# Patient Record
Sex: FEMALE | Race: WHITE | Employment: FULL TIME | URBAN - METROPOLITAN AREA
[De-identification: names, ages, dates, MRNs, and addresses within clinical notes are randomized per-mention and may not be internally consistent; named-entity substitution may affect disease eponyms.]

---

## 2019-01-17 PROBLEM — E04.1 THYROID NODULE: Status: ACTIVE | Noted: 2019-01-17

## 2019-01-17 PROBLEM — E06.3 HASHIMOTO'S THYROIDITIS: Status: ACTIVE | Noted: 2019-01-17

## 2019-05-22 ENCOUNTER — HOSPITAL ENCOUNTER (OUTPATIENT)
Dept: LAB | Age: 29
Discharge: HOME | End: 2019-05-22
Payer: COMMERCIAL

## 2019-05-22 DIAGNOSIS — C73 PAPILLARY THYROID CARCINOMA (HCC): ICD-10-CM

## 2019-05-22 DIAGNOSIS — E89.0 POSTSURGICAL HYPOTHYROIDISM: ICD-10-CM

## 2019-05-22 LAB — TSH W FREE THYROID I,TSHELE: 0.18 UIU/ML (ref 0.36–3.74)

## 2019-05-22 PROCEDURE — 84439 ASSAY OF FREE THYROXINE: CPT

## 2019-05-22 PROCEDURE — 36415 COLL VENOUS BLD VENIPUNCTURE: CPT

## 2019-05-22 PROCEDURE — 86800 THYROGLOBULIN ANTIBODY: CPT

## 2019-05-22 PROCEDURE — 84480 ASSAY TRIIODOTHYRONINE (T3): CPT

## 2019-05-22 PROCEDURE — 84443 ASSAY THYROID STIM HORMONE: CPT

## 2019-05-22 PROCEDURE — 84432 ASSAY OF THYROGLOBULIN: CPT

## 2019-05-23 LAB
T3 SERPL-MCNC: 1.54 NG/ML (ref 0.6–1.81)
T4 FREE SERPL-MCNC: 1.6 NG/DL (ref 0.78–1.46)
THYROGLOB AB SERPL-ACNC: <1 IU/ML (ref 0–0.9)
THYROGLOBULIN, SERUM, 006694: 0.1 NG/ML (ref 1.5–38.5)

## 2019-06-17 ENCOUNTER — HOSPITAL ENCOUNTER (OUTPATIENT)
Dept: ULTRASOUND IMAGING | Age: 29
Discharge: HOME | End: 2019-06-17
Attending: INTERNAL MEDICINE
Payer: COMMERCIAL

## 2019-06-17 VITALS
HEART RATE: 108 BPM | SYSTOLIC BLOOD PRESSURE: 119 MMHG | WEIGHT: 174 LBS | BODY MASS INDEX: 27.25 KG/M2 | RESPIRATION RATE: 20 BRPM | OXYGEN SATURATION: 100 % | DIASTOLIC BLOOD PRESSURE: 77 MMHG | TEMPERATURE: 97.6 F

## 2019-06-17 DIAGNOSIS — C73 PAPILLARY THYROID CARCINOMA (HCC): ICD-10-CM

## 2019-06-17 PROCEDURE — 10005 FNA BX W/US GDN 1ST LES: CPT

## 2019-06-17 PROCEDURE — 74011250636 HC RX REV CODE- 250/636: Performed by: PHYSICIAN ASSISTANT

## 2019-06-17 PROCEDURE — 88173 CYTOPATH EVAL FNA REPORT: CPT

## 2019-06-17 PROCEDURE — 88305 TISSUE EXAM BY PATHOLOGIST: CPT

## 2019-06-17 RX ORDER — LIDOCAINE HYDROCHLORIDE 20 MG/ML
40-120 INJECTION, SOLUTION INFILTRATION; PERINEURAL ONCE
Status: COMPLETED | OUTPATIENT
Start: 2019-06-17 | End: 2019-06-17

## 2019-06-17 RX ADMIN — LIDOCAINE HYDROCHLORIDE 120 MG: 20 INJECTION, SOLUTION INFILTRATION; PERINEURAL at 15:39

## 2019-06-17 NOTE — DISCHARGE INSTRUCTIONS
[address]  Department of Interventional Radiology  [facility]  [phone] Office  [phone] Fax    BIOPSY DISCHARGE INSTRUCTIONS    General Instructions:     A biopsy is the removal of a small piece of tissue for microscopic examination or testing. Healthy tissue can be obtained for the purpose of tissue-type matching for transplants. Unhealthy tissues are more commonly biopsied to diagnose disease. Lung Biopsy:     A needle lung biopsy is performed when there is a mass discovered in the lung area. The most serious risk is infection, bleeding, and pneumothorax (a collapsed lung). Signs of pneumothorax include shortness of breath, rapid heart rate, and blueness of the skin. If any of these occur, call 911. Liver Biopsy: This test helps detect cancer, infections, and the cause of an enlargement of the liver or elevated liver enzymes. It also helps to diagnose a number of liver diseases. The pain associated with the procedure may be felt in the shoulder. The risks include internal bleeding, pneumothorax, and injury to the surrounding organs. Renal Biopsy: This procedure is sometimes done to evaluate a transplanted kidney. It is also used to evaluate unexplained decrease in kidney function, blood, or protein in the urine. You may see bright red blood in the urine the first 24 hours after the test. If the bleeding lasts longer, you need to call your doctor. There is a risk of infection and bleeding into the muscle, which may cause soreness. Spinal Biopsy: This test is sometimes done in conjunction with other procedures. Your back will be sore, as we are taking a small sample of bone, which is slightly more difficult to sample than tissue. General Biopsy:     A mass can grow in any area of the body, and we are taking a specimen as ordered by your doctor. The risks are the same.  They include bleeding, pain, and infection. Home Care Instructions: You may resume your regular diet and medication regimen. Do not drink alcohol, drive, or make any important legal decisions in the next 24 hours. Do not lift anything heavier than a gallon of milk until the soreness goes away. You may use over the counter acetaminophen or ibuprofen for the soreness. You may apply an ice pack to the affected area for 20-30 minutes at time for the first 24 hours. After that, you may apply a heat pack. Call If: You should call your Physician and/or the Radiology Nurse if you have any questions or concerns about the biopsy site. Call if you should have increased pain, fever, redness, drainage, or bleeding more than a small spot on the bandage. Follow-Up Instructions: Please see your ordering doctor as he/she has requested. To Reach Us: If you have any questions about your procedure, please call the Interventional Radiology department at [phone]. After business hours (5pm) and weekends, call the answering service at [phone] and ask for the Radiologist on call to be paged. Si tiene Preguntas acerca del procedimiento, por favor llame al departamento de Radiología Intervencional al [phone]. Después de horas de oficina (5 pm) y los fines de [place], llamar al Yonatan Olvera al [phone] y pregunte por el Radiologo de [facility].        Patient Signature:  Date: 6/17/2019  Discharging Nurse: Senia Boogie RN

## 2019-06-17 NOTE — PROCEDURES
Department of Interventional Radiology  [phone]        Interventional Radiology Brief Procedure Note    Patient: Aftab Barnett MRN: [id number]  SSN: [id number]    YOB: 1990  Age: 33 y.o. Sex: female      Date of Procedure: 6/17/2019    Pre-Procedure Diagnosis: Thyroid Ca s/p thyroidectomy. Post-Procedure Diagnosis: SAME    Procedure(s): Image Guided Biopsy    Brief Description of Procedure: FNA    Performed By: Fabi Pinon MD     Assistants: None    Anesthesia:Lidocaine    Estimated Blood Loss: None    Specimens:  Cytology    Implants:  None    Findings: Two, adjacent areas of hypoechogenicity in the surgical bed. Complications: None    Recommendations: Consider CT Soft Tissue Neck for further evaluation. Follow Up: Dr Scott Culver.      Signed By: Fabi Pinon MD     June 17, 2019

## 2022-03-19 PROBLEM — C73 PAPILLARY THYROID CARCINOMA (HCC): Status: ACTIVE | Noted: 2019-05-22

## 2022-05-30 DIAGNOSIS — C73 PAPILLARY THYROID CARCINOMA (HCC): ICD-10-CM

## 2022-05-30 DIAGNOSIS — E89.0 POSTSURGICAL HYPOTHYROIDISM: Primary | ICD-10-CM

## 2022-07-07 ENCOUNTER — APPOINTMENT (RX ONLY)
Dept: URBAN - NONMETROPOLITAN AREA CLINIC 1 | Facility: CLINIC | Age: 32
Setting detail: DERMATOLOGY
End: 2022-07-07

## 2022-07-07 DIAGNOSIS — L73.8 OTHER SPECIFIED FOLLICULAR DISORDERS: ICD-10-CM | Status: STABLE

## 2022-07-07 DIAGNOSIS — L81.4 OTHER MELANIN HYPERPIGMENTATION: ICD-10-CM

## 2022-07-07 DIAGNOSIS — L57.8 OTHER SKIN CHANGES DUE TO CHRONIC EXPOSURE TO NONIONIZING RADIATION: ICD-10-CM

## 2022-07-07 DIAGNOSIS — D18.0 HEMANGIOMA: ICD-10-CM

## 2022-07-07 DIAGNOSIS — Z71.89 OTHER SPECIFIED COUNSELING: ICD-10-CM

## 2022-07-07 DIAGNOSIS — D22 MELANOCYTIC NEVI: ICD-10-CM

## 2022-07-07 PROBLEM — D22.5 MELANOCYTIC NEVI OF TRUNK: Status: ACTIVE | Noted: 2022-07-07

## 2022-07-07 PROBLEM — D18.01 HEMANGIOMA OF SKIN AND SUBCUTANEOUS TISSUE: Status: ACTIVE | Noted: 2022-07-07

## 2022-07-07 PROCEDURE — ? COUNSELING

## 2022-07-07 PROCEDURE — ? FULL BODY SKIN EXAM

## 2022-07-07 PROCEDURE — ? SUNSCREEN RECOMMENDATIONS

## 2022-07-07 PROCEDURE — 99203 OFFICE O/P NEW LOW 30 MIN: CPT

## 2022-07-07 PROCEDURE — ? TREATMENT REGIMEN

## 2022-07-07 ASSESSMENT — LOCATION DETAILED DESCRIPTION DERM
LOCATION DETAILED: RIGHT FOREHEAD
LOCATION DETAILED: RIGHT SUPERIOR MEDIAL UPPER BACK
LOCATION DETAILED: RIGHT SUPERIOR UPPER BACK
LOCATION DETAILED: LEFT INFERIOR MEDIAL MALAR CHEEK
LOCATION DETAILED: RIGHT INFERIOR CENTRAL MALAR CHEEK

## 2022-07-07 ASSESSMENT — LOCATION SIMPLE DESCRIPTION DERM
LOCATION SIMPLE: LEFT CHEEK
LOCATION SIMPLE: RIGHT FOREHEAD
LOCATION SIMPLE: RIGHT UPPER BACK
LOCATION SIMPLE: RIGHT CHEEK

## 2022-07-07 ASSESSMENT — LOCATION ZONE DERM
LOCATION ZONE: FACE
LOCATION ZONE: TRUNK

## 2022-07-07 NOTE — PROCEDURE: SUNSCREEN RECOMMENDATIONS

## 2022-07-07 NOTE — PROCEDURE: MIPS QUALITY
[employer] message sent to patient   Last physical with PCP was on 9.21.2018  
Quality 226: Preventive Care And Screening: Tobacco Use: Screening And Cessation Intervention: Patient screened for tobacco use and is an ex/non-smoker
Detail Level: Detailed
Quality 130: Documentation Of Current Medications In The Medical Record: Current Medications Documented
Quality 431: Preventive Care And Screening: Unhealthy Alcohol Use - Screening: Patient identified as an unhealthy alcohol user when screened for unhealthy alcohol use using a systematic screening method and received brief counseling

## 2022-07-25 ENCOUNTER — OFFICE VISIT (OUTPATIENT)
Dept: ENDOCRINOLOGY | Age: 32
End: 2022-07-25
Payer: COMMERCIAL

## 2022-07-25 VITALS
OXYGEN SATURATION: 99 % | SYSTOLIC BLOOD PRESSURE: 124 MMHG | DIASTOLIC BLOOD PRESSURE: 84 MMHG | HEART RATE: 83 BPM | HEIGHT: 67 IN | WEIGHT: 188 LBS | BODY MASS INDEX: 29.51 KG/M2

## 2022-07-25 DIAGNOSIS — E89.0 POSTSURGICAL HYPOTHYROIDISM: ICD-10-CM

## 2022-07-25 DIAGNOSIS — C73 PAPILLARY THYROID CARCINOMA (HCC): Primary | ICD-10-CM

## 2022-07-25 LAB
T3 SERPL-MCNC: 1.32 NG/ML (ref 0.6–1.81)
T4 FREE SERPL-MCNC: 1.5 NG/DL (ref 0.78–1.46)
TSH W FREE THYROID IF ABNORMAL: 0.09 UIU/ML (ref 0.36–3.74)

## 2022-07-25 PROCEDURE — 76536 US EXAM OF HEAD AND NECK: CPT | Performed by: INTERNAL MEDICINE

## 2022-07-25 PROCEDURE — 99214 OFFICE O/P EST MOD 30 MIN: CPT | Performed by: INTERNAL MEDICINE

## 2022-07-25 RX ORDER — LEVOTHYROXINE SODIUM 0.15 MG/1
150 TABLET ORAL
Qty: 30 TABLET | Refills: 11 | Status: SHIPPED | OUTPATIENT
Start: 2022-07-25 | End: 2022-07-26 | Stop reason: SDUPTHER

## 2022-07-25 ASSESSMENT — ENCOUNTER SYMPTOMS
DIARRHEA: 0
CONSTIPATION: 0

## 2022-07-25 NOTE — PROGRESS NOTES
hilum.      Follow-up and Dispositions    Return in about 6 months (around 1/25/2023). HISTORY OF PRESENT ILLNESS:    THYROID CANCER     Presentation: Afirma [address] suspicious left lobe/isthmic nodule status post total thyroidectomy 2/15/2019 (pathology revealed an infiltrative, follicular variant papillary thyroid carcinoma in the left lobe measuring 1.5 x 1.1 x 1.0 cm, negative margins, no angioinvasion, no lymphatic invasion, no perineural invasion, no extrathyroidal extension; +lymphocytic thyroiditis; pT1b pNX). Surgical complications: None. Current symptoms: Denies cervical lymphadenopathy, dysphagia, hoarseness. Imaging:  Thyroid ultrasound 1/17/2019: Right lobe 2.01 x 1.83 x 5.82 cm, markedly heterogeneous echotexture. There are no obvious nodules. In the mid right lobe posteriorly there are a couple of hypoechoic areas likely representing pseudo-nodules measuring 0.42 x 0.84 cm and 0.33 x 0.80 cm, respectively. Inferior to the right lobe is a probable lymph node with a fatty hilum measuring 0.71 x 0.93 x 1.24 cm. Isthmus measures 0.70 cm. Left lobe 1.73 x 1.88 x 4.89 cm, markedly heterogeneous echotexture. At the junction of the isthmus and left lobe is a solid, heterogeneous nodule measuring 0.92 x 1.46 x 1.45 cm. The majority of the nodule is somewhat hyperechoic and lobulated although it does have a hypoechoic rim. It does not contain any obvious microcalcifications. Neck ultrasound 5/22/2019: There are no masses in the right thyroid bed. Examination of the right central and lateral cervical compartments reveals no abnormal lymph nodes. There is a hypoechoic nodule in the left thyroid bed measuring 0.47 x 0.54 x 0.69 cm containing no obvious microcalcifications or internal blood flow. Just inferiorly and also in level 6 is a hypoechoic, heterogeneous nodule measuring 0.64 x 1.02 x 0.89 cm containing no obvious microcalcifications or internal blood flow.   There is a left level 3/4 lymph node measuring 0.26 x 0.67 x 1.43 cm containing a normal fatty hilum. There is a left level 2 lymph node measuring 0.33 x 1.10 x 1.34 cm which appears to contain a fatty hilum on the longitudinal view. There is a left lateral level 3 lymph node measuring 0.40 x 0.98 x 1.05 cm with no obvious fatty hilum. FNA biopsy left level 6 nodule 6/17/2019: Nondiagnostic. Neck ultrasound 10/11/2019: There are no masses in the right thyroid bed. There is a right level 3 lymph node measuring 0.34 x 0.65 x 1.18 cm with a fatty hilum. There is a right level 2 lymph node measuring 0.25 x 0.63 x 1.73 cm with a fatty hilum. There is a right level 5 lymph node measuring 0.37 x 1.04 x 1.98 cm with a fatty hilum. There is a hypoechoic nodule in the left thyroid bed measuring 0.29 x 0.45 x 0.59 cm containing no obvious microcalcifications or internal blood flow. Just inferiorly and also in level 6 is a hypoechoic, heterogeneous nodule measuring 0.41 x 0.96 x 1.16 cm containing no obvious microcalcifications or internal blood flow. In some views it does appear that it could contain a fatty hilum and may therefore represent a lymph node. There is a left level 2/3 lymph node measuring 0.27 x 1.04 x 1.28 cm containing a fatty hilum. There is a left level 4 lymph node measuring 0.34 x 1.09 x 1.43 cm containing a fatty hilum. Neck ultrasound 7/15/2020: There are no masses in the right thyroid bed. There is a right level 3 lymph node measuring 0.27 x 0.65 x 0.87 cm with a fatty hilum. There is a hypoechoic nodule in the left thyroid bed measuring 0.28 x 0.39 x 0.31 cm containing no internal blood flow or microcalcifications. Just inferiorly and also in level 6 is a hypoechoic, heterogeneous nodule measuring 0.49 x 0.64 x 0.83 cm containing no internal blood flow or microcalcifications. There is a left level 3/4 lymph node measuring 0.22 x 0.48 x 1.36 cm containing a fatty hilum.   There is a left level 3 lymph node measuring 0.27 x 1.08 x 1.61 cm with a fatty hilum. Neck ultrasound 7/19/2021: The thyroid gland is surgically absent. There are no masses in the right thyroid bed. Examination of the right central and lateral cervical compartments reveals no abnormal lymph nodes. The previously described hypoechoic nodule in the left thyroid bed is no longer visible on today's examination. Just inferiorly and level 6 there is a hypoechoic nodule measuring 0.26 x 0.49 x 0.73 cm with no microcalcifications, smaller in size compared to the previous study. Examination of the left lateral cervical compartments reveals no abnormal lymph nodes. Neck ultrasound 7/25/2022: The thyroid gland is surgically absent. There are no masses in the right thyroid bed. Examination of the right central and lateral cervical compartments reveals no abnormal lymph nodes. There are no masses in the left thyroid bed. There is a left level 6 hypoechoic nodule measuring 0.40 x 0.72 cm, stable. There is a left level 3 lymph node measuring 0.29 x 0.55 x 0.3 cm with a possible eccentric fatty hilum. Labs:  1/17/2019: TSH 4.630, free T4 1.14, thyroid peroxidase antibodies 316.  4/17/2019: TSH 0.526.  5/22/2019: TSH 0.18, free T4 1.6, total T3 1.54, Tg 0.1, Tg Ab <1.0.  9/27/2019: TSH 0.121, free T4 1.88, total T3 156, Tg 0.2, Tg Ab <1.0.  3/17/2020: TSH 5.300, free T4 1.47, Tg 1.3, Tg Ab <1.0.  7/8/2020: TSH 0.933, Tg 0.3, Tg Ab <1.0.  1/15/2021: TSH 0.608, Tg 0.5, Tg Ab <1.0.  7/2/2021: TSH 0.623, free T4 1.25, Tg 0.5, Tg Ab <1.0.  1/19/2022: TSH 4.820, free T4 1.15, Tg 0.7, Tg Ab <1.0.        THYROID DISEASE     Presentation/Diagnosis: Hypothyroidism diagnosed 2015 (predated thyroidectomy). Symptoms: See review of systems. Treatment: Takes generic in AM correctly. Pregnancy status: Status post tubal ligation. Review of Systems   Constitutional:  Positive for fatigue (stable).  Negative for diaphoresis and unexpected weight change. Cardiovascular:  Positive for palpitations (occasionally). Gastrointestinal:  Negative for constipation and diarrhea. Endocrine: Negative for cold intolerance and heat intolerance. Genitourinary:  Negative for menstrual problem. Neurological:  Negative for tremors. Vital Signs:  /84 (Site: Left Upper Arm, Position: Sitting)   Pulse 83   Ht 5' 7\" (1.702 m)   Wt 188 lb (85.3 kg)   SpO2 99%   BMI 29.44 kg/m²       Physical Exam  Constitutional:       General: She is not in acute distress. Neck:      Comments: Thyroidectomy scar. Cardiovascular:      Rate and Rhythm: Normal rate and regular rhythm. Lymphadenopathy:      Cervical: No cervical adenopathy. Neurological:      Motor: No tremor. Orders Placed This Encounter   Procedures    TSH with Reflex     Standing Status:   Future     Standing Expiration Date:   7/25/2023    Thyroglobulin Panel     Standing Status:   Future     Standing Expiration Date:   7/25/2023    TSH with Reflex     Standing Status:   Future     Standing Expiration Date:   7/25/2023    Thyroglobulin Panel     Standing Status:   Future     Standing Expiration Date:   7/25/2023    US, HEAD/NECK TISSUES,REAL TIME       Current Outpatient Medications   Medication Sig Dispense Refill    levothyroxine (SYNTHROID) 150 MCG tablet Take 1 tablet by mouth every morning (before breakfast) 30 tablet 11     No current facility-administered medications for this visit.

## 2022-07-26 ENCOUNTER — TELEPHONE (OUTPATIENT)
Dept: ENDOCRINOLOGY | Age: 32
End: 2022-07-26

## 2022-07-26 DIAGNOSIS — E89.0 POSTSURGICAL HYPOTHYROIDISM: Primary | ICD-10-CM

## 2022-07-26 RX ORDER — LEVOTHYROXINE SODIUM 137 UG/1
137 TABLET ORAL
Qty: 30 TABLET | Refills: 11 | Status: SHIPPED | OUTPATIENT
Start: 2022-07-26

## 2022-07-27 LAB
THYROGLOB AB SERPL-ACNC: <1 IU/ML (ref 0–0.9)
THYROGLOBULIN: <0.1 NG/ML (ref 1.5–38.5)

## 2023-01-25 ENCOUNTER — OFFICE VISIT (OUTPATIENT)
Dept: ENDOCRINOLOGY | Age: 33
End: 2023-01-25
Payer: COMMERCIAL

## 2023-01-25 VITALS
SYSTOLIC BLOOD PRESSURE: 108 MMHG | OXYGEN SATURATION: 98 % | DIASTOLIC BLOOD PRESSURE: 74 MMHG | HEART RATE: 78 BPM | WEIGHT: 187 LBS | BODY MASS INDEX: 29.29 KG/M2

## 2023-01-25 DIAGNOSIS — E89.0 POSTSURGICAL HYPOTHYROIDISM: ICD-10-CM

## 2023-01-25 DIAGNOSIS — C73 PAPILLARY THYROID CARCINOMA (HCC): Primary | ICD-10-CM

## 2023-01-25 DIAGNOSIS — C73 PAPILLARY THYROID CARCINOMA (HCC): ICD-10-CM

## 2023-01-25 PROCEDURE — 99214 OFFICE O/P EST MOD 30 MIN: CPT | Performed by: INTERNAL MEDICINE

## 2023-01-25 RX ORDER — LEVOTHYROXINE SODIUM 137 UG/1
137 TABLET ORAL
Qty: 90 TABLET | Refills: 3 | Status: SHIPPED | OUTPATIENT
Start: 2023-01-25

## 2023-01-25 ASSESSMENT — ENCOUNTER SYMPTOMS
CONSTIPATION: 0
DIARRHEA: 0

## 2023-01-25 NOTE — PROGRESS NOTES
Oliver Gustafson MD, [facility]  [address], [address] [facility], [address] [place] [address] Ave  Phone [phone]  Facsimile [phone]          Maricruz Briscoe is a 34 y.o. female seen 1/25/2023 for follow-up of thyroid cancer and hypothyroidism        ASSESSMENT AND PLAN:    1. Papillary thyroid carcinoma (HCC)  Left lobe follicular variant, papillary thyroid carcinoma measuring 1.5 cm status post total thyroidectomy 2/2019 (pT1b pNX). There were no high risk features on surgical pathology and radioactive iodine remnant ablation was not pursued. Neck ultrasound performed 5/2019 revealed the presence of 2 left level 6 hypoechoic nodules. She underwent an FNA biopsy of the inferior left level 6 hypoechoic nodule 6/2019 in interventional radiology which was nondiagnostic. Since her postoperative thyroglobulin level was very low, we elected to follow closely. Neck ultrasound performed 7/2022 was stable compared to the prior study. Her thyroglobulin level has been very low and appears to correlate with her TSH. I will check her thyroglobulin level today and prior to her follow-up visit in 6 months. 2. Postsurgical hypothyroidism  Goal TSH 0.4-2.0 (although 0.1-0.4 is acceptable). I will check her thyroid function tests today and let her know if her dose needs to be adjusted. - levothyroxine (SYNTHROID) 137 MCG tablet; Take 1 tablet by mouth every morning (before breakfast)  Dispense: 90 tablet; Refill: 3      Follow-up and Dispositions    Return in about 6 months (around 7/25/2023).          HISTORY OF PRESENT ILLNESS:    THYROID CANCER     Presentation: Afirma [address] suspicious left lobe/isthmic nodule status post total thyroidectomy 2/15/2019 (pathology revealed an infiltrative, follicular variant papillary thyroid carcinoma in the left lobe measuring 1.5 x 1.1 x 1.0 cm, negative margins, no angioinvasion, no lymphatic invasion, no perineural invasion, no extrathyroidal extension; +lymphocytic thyroiditis; pT1b pNX). Surgical complications: None. Current symptoms: Denies cervical lymphadenopathy, dysphagia, hoarseness. Imaging:  Thyroid ultrasound 1/17/2019: Right lobe 2.01 x 1.83 x 5.82 cm, markedly heterogeneous echotexture. There are no obvious nodules. In the mid right lobe posteriorly there are a couple of hypoechoic areas likely representing pseudo-nodules measuring 0.42 x 0.84 cm and 0.33 x 0.80 cm, respectively. Inferior to the right lobe is a probable lymph node with a fatty hilum measuring 0.71 x 0.93 x 1.24 cm. Isthmus measures 0.70 cm. Left lobe 1.73 x 1.88 x 4.89 cm, markedly heterogeneous echotexture. At the junction of the isthmus and left lobe is a solid, heterogeneous nodule measuring 0.92 x 1.46 x 1.45 cm. The majority of the nodule is somewhat hyperechoic and lobulated although it does have a hypoechoic rim. It does not contain any obvious microcalcifications. Neck ultrasound 5/22/2019: There are no masses in the right thyroid bed. Examination of the right central and lateral cervical compartments reveals no abnormal lymph nodes. There is a hypoechoic nodule in the left thyroid bed measuring 0.47 x 0.54 x 0.69 cm containing no obvious microcalcifications or internal blood flow. Just inferiorly and also in level 6 is a hypoechoic, heterogeneous nodule measuring 0.64 x 1.02 x 0.89 cm containing no obvious microcalcifications or internal blood flow. There is a left level 3/4 lymph node measuring 0.26 x 0.67 x 1.43 cm containing a normal fatty hilum. There is a left level 2 lymph node measuring 0.33 x 1.10 x 1.34 cm which appears to contain a fatty hilum on the longitudinal view. There is a left lateral level 3 lymph node measuring 0.40 x 0.98 x 1.05 cm with no obvious fatty hilum. FNA biopsy left level 6 nodule 6/17/2019: Nondiagnostic. Neck ultrasound 10/11/2019: There are no masses in the right thyroid bed.   There is a right level 3 lymph node measuring 0.34 x 0.65 x 1.18 cm with a fatty hilum. There is a right level 2 lymph node measuring 0.25 x 0.63 x 1.73 cm with a fatty hilum. There is a right level 5 lymph node measuring 0.37 x 1.04 x 1.98 cm with a fatty hilum. There is a hypoechoic nodule in the left thyroid bed measuring 0.29 x 0.45 x 0.59 cm containing no obvious microcalcifications or internal blood flow. Just inferiorly and also in level 6 is a hypoechoic, heterogeneous nodule measuring 0.41 x 0.96 x 1.16 cm containing no obvious microcalcifications or internal blood flow. In some views it does appear that it could contain a fatty hilum and may therefore represent a lymph node. There is a left level 2/3 lymph node measuring 0.27 x 1.04 x 1.28 cm containing a fatty hilum. There is a left level 4 lymph node measuring 0.34 x 1.09 x 1.43 cm containing a fatty hilum. Neck ultrasound 7/15/2020: There are no masses in the right thyroid bed. There is a right level 3 lymph node measuring 0.27 x 0.65 x 0.87 cm with a fatty hilum. There is a hypoechoic nodule in the left thyroid bed measuring 0.28 x 0.39 x 0.31 cm containing no internal blood flow or microcalcifications. Just inferiorly and also in level 6 is a hypoechoic, heterogeneous nodule measuring 0.49 x 0.64 x 0.83 cm containing no internal blood flow or microcalcifications. There is a left level 3/4 lymph node measuring 0.22 x 0.48 x 1.36 cm containing a fatty hilum. There is a left level 3 lymph node measuring 0.27 x 1.08 x 1.61 cm with a fatty hilum. Neck ultrasound 7/19/2021: The thyroid gland is surgically absent. There are no masses in the right thyroid bed. Examination of the right central and lateral cervical compartments reveals no abnormal lymph nodes. The previously described hypoechoic nodule in the left thyroid bed is no longer visible on today's examination.   Just inferiorly and level 6 there is a hypoechoic nodule measuring 0.26 x 0.49 x 0.73 cm with no microcalcifications, smaller in size compared to the previous study. Examination of the left lateral cervical compartments reveals no abnormal lymph nodes. Neck ultrasound 7/25/2022: The thyroid gland is surgically absent. There are no masses in the right thyroid bed. Examination of the right central and lateral cervical compartments reveals no abnormal lymph nodes. There are no masses in the left thyroid bed. There is a left level 6 hypoechoic nodule measuring 0.40 x 0.72 cm, stable. There is a left level 3 lymph node measuring 0.29 x 0.55 x 0.3 cm with a possible eccentric fatty hilum. Labs:  1/17/2019: TSH 4.630, free T4 1.14, thyroid peroxidase antibodies 316.  4/17/2019: TSH 0.526.  5/22/2019: TSH 0.18, free T4 1.6, total T3 1.54, Tg 0.1, Tg Ab <1.0.  9/27/2019: TSH 0.121, free T4 1.88, total T3 156, Tg 0.2, Tg Ab <1.0.  3/17/2020: TSH 5.300, free T4 1.47, Tg 1.3, Tg Ab <1.0.  7/8/2020: TSH 0.933, Tg 0.3, Tg Ab <1.0.  1/15/2021: TSH 0.608, Tg 0.5, Tg Ab <1.0.  7/2/2021: TSH 0.623, free T4 1.25, Tg 0.5, Tg Ab <1.0.  1/19/2022: TSH 4.820, free T4 1.15, Tg 0.7, Tg Ab <1.0.  7/25/2022: TSH 0.09, free T4 1.5, total T3 1.32, Tg <0.1, Tg Ab <1.0.        THYROID DISEASE     Presentation/Diagnosis: Hypothyroidism diagnosed 2015 (predated thyroidectomy). Symptoms: See review of systems. Treatment: Takes generic in AM correctly. Pregnancy status: Status post tubal ligation. Review of Systems   Constitutional:  Positive for fatigue (stable). Negative for diaphoresis and unexpected weight change. Cardiovascular:  Negative for palpitations. Gastrointestinal:  Negative for constipation and diarrhea. Endocrine: Negative for cold intolerance and heat intolerance. Genitourinary:  Negative for menstrual problem. Neurological:  Negative for tremors.      Vital Signs:  /74   Pulse 78   Wt 187 lb (84.8 kg)   SpO2 98%   BMI 29.29 kg/m²     Wt Readings from Last 3 Encounters:   01/25/23 187 lb (84.8 kg)   07/25/22 188 lb (85.3 kg)   07/19/21 185 lb (83.9 kg)       Physical Exam  Constitutional:       General: She is not in acute distress. Neck:      Comments: Thyroidectomy scar. Cardiovascular:      Rate and Rhythm: Normal rate and regular rhythm. Lymphadenopathy:      Cervical: No cervical adenopathy. Neurological:      Motor: No tremor. Orders Placed This Encounter   Procedures    TSH with Reflex     Standing Status:   Future     Standing Expiration Date:   1/25/2024    Thyroglobulin Panel     Standing Status:   Future     Standing Expiration Date:   1/25/2024    TSH with Reflex     Standing Status:   Future     Standing Expiration Date:   1/25/2024    Thyroglobulin Panel     Standing Status:   Future     Standing Expiration Date:   1/25/2024       Current Outpatient Medications   Medication Sig Dispense Refill    levothyroxine (SYNTHROID) 137 MCG tablet Take 1 tablet by mouth every morning (before breakfast) 90 tablet 3     No current facility-administered medications for this visit.

## 2023-01-26 LAB — TSH W FREE THYROID IF ABNORMAL: 2.1 UIU/ML (ref 0.36–3.74)

## 2023-01-29 LAB
THYROGLOB AB SERPL-ACNC: <1 IU/ML (ref 0–0.9)
THYROGLOBULIN: 0.2 NG/ML (ref 1.5–38.5)

## 2023-01-30 ENCOUNTER — TELEPHONE (OUTPATIENT)
Dept: ENDOCRINOLOGY | Age: 33
End: 2023-01-30

## 2023-01-30 DIAGNOSIS — E89.0 POSTSURGICAL HYPOTHYROIDISM: ICD-10-CM

## 2023-01-30 RX ORDER — LEVOTHYROXINE SODIUM 137 UG/1
TABLET ORAL
Qty: 100 TABLET | Refills: 3 | Status: SHIPPED | OUTPATIENT
Start: 2023-01-30

## 2023-01-30 NOTE — TELEPHONE ENCOUNTER
Have her try increasing her levothyroxine slightly. Her thyroglobulin level (tumor marker) was very low and stable, which is great news.

## 2024-04-26 DIAGNOSIS — E89.0 POSTSURGICAL HYPOTHYROIDISM: ICD-10-CM

## 2024-04-26 RX ORDER — LEVOTHYROXINE SODIUM 137 UG/1
TABLET ORAL
Qty: 100 TABLET | Refills: 3 | OUTPATIENT
Start: 2024-04-26

## 2024-05-21 ENCOUNTER — OFFICE VISIT (OUTPATIENT)
Dept: ENDOCRINOLOGY | Age: 34
End: 2024-05-21
Payer: COMMERCIAL

## 2024-05-21 VITALS
BODY MASS INDEX: 30.7 KG/M2 | SYSTOLIC BLOOD PRESSURE: 120 MMHG | DIASTOLIC BLOOD PRESSURE: 72 MMHG | WEIGHT: 196 LBS | HEART RATE: 83 BPM | OXYGEN SATURATION: 99 %

## 2024-05-21 DIAGNOSIS — C73 PAPILLARY THYROID CARCINOMA (HCC): Primary | ICD-10-CM

## 2024-05-21 DIAGNOSIS — E89.0 POSTSURGICAL HYPOTHYROIDISM: ICD-10-CM

## 2024-05-21 PROCEDURE — 76536 US EXAM OF HEAD AND NECK: CPT | Performed by: INTERNAL MEDICINE

## 2024-05-21 PROCEDURE — 99214 OFFICE O/P EST MOD 30 MIN: CPT | Performed by: INTERNAL MEDICINE

## 2024-05-21 RX ORDER — LEVOTHYROXINE SODIUM 137 UG/1
137 TABLET ORAL DAILY
Qty: 90 TABLET | Refills: 3 | Status: SHIPPED | OUTPATIENT
Start: 2024-05-21

## 2024-05-21 ASSESSMENT — ENCOUNTER SYMPTOMS
CONSTIPATION: 0
DIARRHEA: 0

## 2024-05-21 NOTE — PROGRESS NOTES
Oliver Haider MD, FACE  [facility]od[facility]  [address] [place] [address], [address]  [place] [address]  Phone [phone]  Facsimile [phone]          Diogo Calix is a 33 y.o. female seen 5/21/2024 for follow-up of thyroid cancer and hypothyroidism (has not been here since 1/2023)        ASSESSMENT AND PLAN:    1. Papillary thyroid carcinoma (HCC)  Left lobe follicular variant, papillary thyroid carcinoma measuring 1.5 cm status post total thyroidectomy 2/2019 (pT1b pNX).  There were no high risk features on surgical pathology and radioactive iodine remnant ablation was not pursued.  Neck ultrasound performed 5/2019 revealed the presence of 2 left level 6 hypoechoic nodules.  She underwent an FNA biopsy of the inferior left level 6 hypoechoic nodule 6/2019 in interventional radiology which was nondiagnostic.  Since her postoperative thyroglobulin level was very low, we elected to follow closely.  Neck ultrasound performed today no longer revealed an obvious left level 6 nodule.  The left level 3 lymph node was stable.  Her most recent thyroglobulin level was negative 5/2024.  Follow-up in 6 months with a thyroglobulin level prior to visit.      2. Postsurgical hypothyroidism  Goal TSH 0.4-2.0 (although 0.1-0.4 is acceptable).  She is currently biochemically hypothyroid, but she admits that she has not been fully compliant with her levothyroxine.  We discussed that she needs to take her levothyroxine every day.  She was also instructed on the proper way to take thyroid hormone replacement.  I will repeat her thyroid function tests in 6 weeks and prior to her next visit.    - levothyroxine (SYNTHROID) 137 MCG tablet; Take 1 tablet by mouth every morning (before breakfast)  Dispense: 90 tablet; Refill: 3            Procedures:    Neck ultrasound 5/21/2024: The thyroid gland is surgically absent.  There are no masses in the thyroid bed.  Examination of the right central and

## 2024-07-05 ENCOUNTER — APPOINTMENT (RX ONLY)
Dept: URBAN - NONMETROPOLITAN AREA CLINIC 1 | Facility: CLINIC | Age: 34
Setting detail: DERMATOLOGY
End: 2024-07-05

## 2024-07-05 DIAGNOSIS — L82.1 OTHER SEBORRHEIC KERATOSIS: ICD-10-CM | Status: UNCHANGED

## 2024-07-05 DIAGNOSIS — D18.0 HEMANGIOMA: ICD-10-CM | Status: STABLE

## 2024-07-05 DIAGNOSIS — D22 MELANOCYTIC NEVI: ICD-10-CM | Status: UNCHANGED

## 2024-07-05 DIAGNOSIS — L57.8 OTHER SKIN CHANGES DUE TO CHRONIC EXPOSURE TO NONIONIZING RADIATION: ICD-10-CM | Status: STABLE

## 2024-07-05 PROBLEM — D23.72 OTHER BENIGN NEOPLASM OF SKIN OF LEFT LOWER LIMB, INCLUDING HIP: Status: ACTIVE | Noted: 2024-07-05

## 2024-07-05 PROBLEM — D18.01 HEMANGIOMA OF SKIN AND SUBCUTANEOUS TISSUE: Status: ACTIVE | Noted: 2024-07-05

## 2024-07-05 PROBLEM — D23.39 OTHER BENIGN NEOPLASM OF SKIN OF OTHER PARTS OF FACE: Status: ACTIVE | Noted: 2024-07-05

## 2024-07-05 PROBLEM — D22.5 MELANOCYTIC NEVI OF TRUNK: Status: ACTIVE | Noted: 2024-07-05

## 2024-07-05 PROCEDURE — ? FULL BODY SKIN EXAM

## 2024-07-05 PROCEDURE — ? COUNSELING

## 2024-07-05 PROCEDURE — 99213 OFFICE O/P EST LOW 20 MIN: CPT

## 2024-07-05 PROCEDURE — ? SUNSCREEN RECOMMENDATIONS

## 2024-07-05 ASSESSMENT — LOCATION DETAILED DESCRIPTION DERM
LOCATION DETAILED: SUPERIOR MID FOREHEAD
LOCATION DETAILED: LEFT PROXIMAL DORSAL FOREARM
LOCATION DETAILED: RIGHT SUPERIOR MEDIAL UPPER BACK
LOCATION DETAILED: LEFT MEDIAL UPPER BACK
LOCATION DETAILED: RIGHT PROXIMAL DORSAL FOREARM
LOCATION DETAILED: LEFT MEDIAL SUPERIOR CHEST

## 2024-07-05 ASSESSMENT — LOCATION SIMPLE DESCRIPTION DERM
LOCATION SIMPLE: CHEST
LOCATION SIMPLE: LEFT FOREARM
LOCATION SIMPLE: RIGHT FOREARM
LOCATION SIMPLE: RIGHT UPPER BACK
LOCATION SIMPLE: SUPERIOR FOREHEAD
LOCATION SIMPLE: LEFT UPPER BACK

## 2024-07-05 ASSESSMENT — LOCATION ZONE DERM
LOCATION ZONE: ARM
LOCATION ZONE: FACE
LOCATION ZONE: TRUNK

## 2025-04-21 ENCOUNTER — OFFICE VISIT (OUTPATIENT)
Dept: ENDOCRINOLOGY | Age: 35
End: 2025-04-21
Payer: COMMERCIAL

## 2025-04-21 VITALS
BODY MASS INDEX: 32.02 KG/M2 | DIASTOLIC BLOOD PRESSURE: 86 MMHG | RESPIRATION RATE: 18 BRPM | HEART RATE: 76 BPM | SYSTOLIC BLOOD PRESSURE: 138 MMHG | OXYGEN SATURATION: 98 % | WEIGHT: 204 LBS | HEIGHT: 67 IN

## 2025-04-21 DIAGNOSIS — Z85.850 HISTORY OF THYROID CANCER: Primary | ICD-10-CM

## 2025-04-21 DIAGNOSIS — E89.0 POSTSURGICAL HYPOTHYROIDISM: ICD-10-CM

## 2025-04-21 DIAGNOSIS — Z85.850 HISTORY OF THYROID CANCER: ICD-10-CM

## 2025-04-21 PROCEDURE — 99214 OFFICE O/P EST MOD 30 MIN: CPT | Performed by: INTERNAL MEDICINE

## 2025-04-21 RX ORDER — LEVOTHYROXINE SODIUM 137 UG/1
137 TABLET ORAL DAILY
Qty: 90 TABLET | Refills: 3 | Status: SHIPPED | OUTPATIENT
Start: 2025-04-21

## 2025-04-21 ASSESSMENT — ENCOUNTER SYMPTOMS
CONSTIPATION: 0
DIARRHEA: 0

## 2025-04-21 NOTE — PROGRESS NOTES
Oliver Haider MD, [facility]  [address], [address]  [place] [address]  Phone [phone]  Facsimile [phone]          Diogo Calix is a 34 y.o. female seen 4/21/2025 for follow-up of thyroid cancer and hypothyroidism         ASSESSMENT AND PLAN:    1. History of papillary thyroid carcinoma  Left lobe follicular variant, papillary thyroid carcinoma measuring 1.5 cm status post total thyroidectomy 2/2019 (pT1b pNX).  There were no high risk features on surgical pathology and radioactive iodine remnant ablation was not pursued.  Neck ultrasound performed 5/2019 revealed the presence of 2 left level 6 hypoechoic nodules.  She underwent an FNA biopsy of the inferior left level 6 hypoechoic nodule 6/2019 in interventional radiology which was nondiagnostic.  Since her postoperative thyroglobulin level was very low, we elected to follow closely.  Neck ultrasound performed 5/2024 no longer revealed an obvious left level 6 nodule.  The left level 3 lymph node was stable.  Her most recent thyroglobulin level was negative 5/2024 and will be updated today.  Follow-up in 1 year with a thyroglobulin level prior to visit.      2. Postsurgical hypothyroidism  Goal TSH 0.4-2.0 (although 0.1-0.4 is acceptable).  I will assess her thyroid function today.    - levothyroxine (SYNTHROID) 137 MCG tablet; Take 1 tablet by mouth Daily  Dispense: 90 tablet; Refill: 3      Follow-up and Dispositions    Return in about 1 year (around 4/21/2026).         HISTORY OF PRESENT ILLNESS:    THYROID CANCER     Presentation: Afirma GSC suspicious left lobe/isthmic nodule status post total thyroidectomy 2/15/2019 (pathology revealed an infiltrative, follicular variant papillary thyroid carcinoma in the left lobe measuring 1.5 x 1.1 x 1.0 cm, negative margins, no angioinvasion, no lymphatic invasion, no perineural invasion, no extrathyroidal extension; +lymphocytic thyroiditis;

## 2025-04-22 ENCOUNTER — RESULTS FOLLOW-UP (OUTPATIENT)
Dept: ENDOCRINOLOGY | Age: 35
End: 2025-04-22

## 2025-04-22 LAB
THYROGLOB AB SERPL-ACNC: 1.9 IU/ML (ref 0–0.9)
TSH W FREE THYROID IF ABNORMAL: 1.02 UIU/ML (ref 0.27–4.2)

## 2025-05-06 LAB
THYROGLOB AB SERPL-ACNC: 1.9 IU/ML (ref 0–0.9)
THYROGLOBULIN: <2 NG/ML